# Patient Record
Sex: MALE | URBAN - METROPOLITAN AREA
[De-identification: names, ages, dates, MRNs, and addresses within clinical notes are randomized per-mention and may not be internally consistent; named-entity substitution may affect disease eponyms.]

---

## 2021-09-21 ENCOUNTER — EMERGENCY (EMERGENCY)
Facility: HOSPITAL | Age: 41
LOS: 1 days | Discharge: HOME | End: 2021-09-21
Attending: EMERGENCY MEDICINE | Admitting: EMERGENCY MEDICINE
Payer: SELF-PAY

## 2021-09-21 VITALS
RESPIRATION RATE: 17 BRPM | OXYGEN SATURATION: 99 % | DIASTOLIC BLOOD PRESSURE: 78 MMHG | HEART RATE: 84 BPM | SYSTOLIC BLOOD PRESSURE: 130 MMHG | TEMPERATURE: 98 F

## 2021-09-21 PROCEDURE — 99284 EMERGENCY DEPT VISIT MOD MDM: CPT

## 2021-09-21 NOTE — ED ADULT TRIAGE NOTE - CHIEF COMPLAINT QUOTE
Patient brought in from the streets for etoh intox with no complaints. Patient refusing to provide name and refusing to answer assessment questions. Patient ambulatory in triage with steady gait; awake and alert. VSS

## 2021-09-21 NOTE — ED PROVIDER NOTE - CLINICAL SUMMARY MEDICAL DECISION MAKING FREE TEXT BOX
Unknown aged male brought in by EMS for intox. Pt is awake and alert, gait steady. Refusing to participate in HPI and ROS. physical exam is benign other than malodorous and unkempt. VS WNL and gait steady. Will consult  for assistance for shelter as pt is likely homeless and discharge as pt has no medical complaints and has normal gait.

## 2021-09-21 NOTE — ED ADULT NURSE REASSESSMENT NOTE - NS ED NURSE REASSESS COMMENT FT1
Patient primarily seen by MD Rawls. Patient ambulatory with gait steady, SW contacted, patient given metro card. Patient escorted by  out.

## 2021-09-21 NOTE — ED PROVIDER NOTE - OBJECTIVE STATEMENT
Pt with unknown name/age/demographics that was brought in by EMS from street for intoxication. Pt has no complaints and is no cooperative with HPI.

## 2021-09-21 NOTE — PROVIDER CONTACT NOTE (OTHER) - ASSESSMENT
Referred by RN mgr; met with pt; pt refused to speak with SW; simply stated he had a headache.  Pt did not verify his name nor details of his living situation.

## 2021-09-21 NOTE — ED PROVIDER NOTE - NSFOLLOWUPINSTRUCTIONS_ED_ALL_ED_FT
Reason for Disposition   New neurologic deficit that is present NOW, sudden onset of ANY of the following: * Weakness of the face, arm, or leg on one side of the body * Numbness of the face, arm, or leg on one side of the body * Loss of speech or garbled speech    Protocols used: ST NEUROLOGIC DEFICIT-A-OH    Pt states the right side of his face is droopy and when he went to drink out of a straw on the right side of his mouth the liquid came out. Care advice given.    No signs of emergency medical condition on today's workup.  Presumptive diagnosis made, but further evaluation may be required by your primary care doctor or specialist for a definitive diagnosis.  Therefore, follow up as directed and if symptoms change/worsen or any emergency conditions, please return to the ER.  Please follow up with your primary care doctor after you leave the emergency department so that they can follow up and conduct more testing and treatment as they deem necessary. If you have worsening signs or symptoms of what you came in to the Emergency Department today and are not able to see your doctor, go to your nearest emergency department or return to the [facility] emergency department for further care and management.

## 2021-09-21 NOTE — ED PROVIDER NOTE - PATIENT PORTAL LINK FT
You can access the FollowMyHealth Patient Portal offered by [facility] by registering at the following website: [email or web address]. By joining [employer]’s FollowMyHealth portal, you will also be able to view your health information using other applications (apps) compatible with our system.